# Patient Record
(demographics unavailable — no encounter records)

---

## 2024-10-10 NOTE — HISTORY OF PRESENT ILLNESS
[FreeTextEntry1] : Patient with a history of Crohn's ileocolitis since early childhood.  Currently on Humira every other week with Apriso 4 pills daily.  Last colonoscopy February 2023 showed pseudopolyps in the cecum and internal hemorrhoids.  Biopsies showed cryptitis in the ascending and cecum.  Transverse sigmoid rectum biopsies were normal.  Patient's states she feels Humira may not be working.  She is getting intermittent lower abdominal cramping and mucus few times a month.  Feels like a mild flare of her Crohn's.  Currently heartburn is controlled with diet alone.  Previously having heartburn 4 times a week and taking omeprazole 20 mg as needed.  Latest blood work done October 2024 showed normal ESR CRP CMP.  Hepatitis B surface antibody positive, QuantiFERON plus negative.  Humira level results are not back

## 2024-10-10 NOTE — ASSESSMENT
[FreeTextEntry1] : A/P Patient with Crohn's ileocolitis Due for colonoscopy in February 2020 Awaiting Humira levels.  My IBD nurse will call lab for results.  If these were not done patient will repeat blood work to get Humira lab Telephone appointment in 4 weeks.  If patient is continuing to feel like she is having low-grade flares of her Crohn's ileocolitis, we consider changing her to Skyrizi.  Patient unable to do Remicade as she lives in the [place] and is working during the day.  She would be unable to come for regular infusion I renewed Apriso

## 2024-10-10 NOTE — REASON FOR VISIT
[Follow-up] : a follow-up of an existing diagnosis [FreeTextEntry1] : Patient with a history of Crohn's ileocolitis.